# Patient Record
Sex: FEMALE
[De-identification: names, ages, dates, MRNs, and addresses within clinical notes are randomized per-mention and may not be internally consistent; named-entity substitution may affect disease eponyms.]

---

## 2019-03-30 ENCOUNTER — HOSPITAL ENCOUNTER (INPATIENT)
Dept: HOSPITAL 42 - ED | Age: 49
LOS: 6 days | Discharge: HOME | DRG: 154 | End: 2019-04-05
Attending: INTERNAL MEDICINE | Admitting: INTERNAL MEDICINE
Payer: COMMERCIAL

## 2019-03-30 VITALS — BODY MASS INDEX: 23.3 KG/M2

## 2019-03-30 DIAGNOSIS — K46.0: Primary | ICD-10-CM

## 2019-03-30 DIAGNOSIS — F17.200: ICD-10-CM

## 2019-03-30 DIAGNOSIS — E87.6: ICD-10-CM

## 2019-03-30 DIAGNOSIS — J02.9: ICD-10-CM

## 2019-03-30 DIAGNOSIS — K52.9: ICD-10-CM

## 2019-03-30 DIAGNOSIS — D50.9: ICD-10-CM

## 2019-03-30 DIAGNOSIS — Z98.84: ICD-10-CM

## 2019-03-30 LAB
ALBUMIN SERPL-MCNC: 4 G/DL
ALBUMIN/GLOB SERPL: 1.4 {RATIO}
ALT SERPL-CCNC: 19 U/L
AMYLASE SERPL-CCNC: 49 U/L
APPEARANCE UR: CLEAR
APTT BLD: 28.9 SECONDS
AST SERPL-CCNC: 25 U/L
BASOPHILS # BLD AUTO: 0.01 K/MM3
BASOPHILS NFR BLD: 0.1 %
BILIRUB UR-MCNC: NEGATIVE MG/DL
BUN SERPL-MCNC: 18 MG/DL
CALCIUM SERPL-MCNC: 9.1 MG/DL
COLOR UR: (no result)
EOSINOPHIL # BLD: 0.1 10*3/UL
EOSINOPHIL NFR BLD: 0.6 %
EPI CELLS #/AREA URNS HPF: (no result) /HPF
ERYTHROCYTE [DISTWIDTH] IN BLOOD BY AUTOMATED COUNT: 15.9 %
GFR NON-AFRICAN AMERICAN: > 60
GLUCOSE UR STRIP-MCNC: NEGATIVE MG/DL
HGB BLD-MCNC: 11.9 G/DL
INR PPP: 1.18
LEUKOCYTE ESTERASE UR-ACNC: NEGATIVE LEU/UL
LIPASE SERPL-CCNC: 46 U/L
LYMPHOCYTES # BLD: 1.8 10*3/UL
LYMPHOCYTES NFR BLD AUTO: 20.7 %
MCH RBC QN AUTO: 27.2 PG
MCHC RBC AUTO-ENTMCNC: 32.6 G/DL
MCV RBC AUTO: 83.3 FL
MONOCYTES # BLD AUTO: 0.7 10*3/UL
MONOCYTES NFR BLD: 7.8 %
PH UR STRIP: 6 [PH]
PLATELET # BLD: 251 10^3/UL
PMV BLD AUTO: 10.3 FL
PROT UR STRIP-MCNC: NEGATIVE MG/DL
PROTHROMBIN TIME: 13.1 SECONDS
RBC # BLD AUTO: 4.38 10^6/UL
RBC # UR STRIP: (no result) /UL
SP GR UR STRIP: 1.01
UROBILINOGEN UR STRIP-ACNC: 0.2 E.U./DL
WBC # BLD AUTO: 8.7 10^3/UL
WBC #/AREA URNS HPF: (no result) /HPF

## 2019-03-30 RX ADMIN — METRONIDAZOLE SCH: 500 INJECTION, SOLUTION INTRAVENOUS at 23:42

## 2019-03-30 RX ADMIN — METRONIDAZOLE SCH MLS/HR: 500 INJECTION, SOLUTION INTRAVENOUS at 20:49

## 2019-03-30 NOTE — CP.PCM.CON
<Tj Magdaleno - Last Filed: 03/30/19 20:42>





History of Present Illness





- History of Present Illness


History of Present Illness: 





Surgery Consult note. Dr. Jimenez 





49yo F w PMHx of Anemia and gastric bypass surgery Sept 2017 comes in with 2 

days of left sided abdominal pain which is worsening, nausea and vomiting x 2 

episodes today, obstipation since yesterday AM and PO intolerance. Abdominal 

pain described constant, sharp, severe and worsening. Patient reports 160lb 

weight loss since bariatric surgery 2.5 years ago. Denies ever having similar 

symptoms before. Denies any fevers or chills. No Chest pain, no SOB. Last meal 

yesterday morning ~40 hours ago. Has had sips of water since and has had severe 

abdominal pain when she drank some juice yesterday afternoon. Last BM yesterday 

morning, no flatus since. No urinary complaints. 





CT scan in the ED with concerns for severely congested small intestine in the 

LUQ with gastric bypass hx concerning for a possible internal hernia. 





PMHx: Anemia


PSHx: Gastric Bypass surgery Sept 2017. Has lost approx 160lbs since


Family Hx: Non-contributory


Social Hx: Admits to Tobacco use. Denies ETOH abuse. Denies illicit drugs


Allergy: ASA





Review of Systems





- Review of Systems


All systems: reviewed and no additional remarkable complaints except





- Constitutional


Constitutional: Anorexia, Weight Loss





- EENT


Eyes: absent: Change in Vision





- Cardiovascular


Cardiovascular: absent: Chest Pain, Diaphoresis, Dyspnea





- Respiratory


Respiratory: absent: Dyspnea





- Gastrointestinal


Gastrointestinal: Abdominal Pain, Belching, Change in Bowel Habits, Nausea, 

Vomiting





- Genitourinary


Genitourinary: absent: Dysuria





Past Patient History





- Past Medical History & Family History


Past Medical History?: Yes


Past Family History: Reviewed and not pertinent





- Past Social History


Smoking Status: Current Some Days Smoker


Alcohol: None


Drugs: Denies





- CARDIAC


Hx Pacemaker: No





- NEUROLOGICAL


Hx Paralysis: No





- HEMATOLOGICAL/ONCOLOGICAL


Hx Blood Transfusions: Yes


Hx Blood Transfusion Reaction: No





- MUSCULOSKELETAL/RHEUMATOLOGICAL


Hx Musculoskeletal Disorders: No





- PSYCHIATRIC


Hx Psychophysiologic Disorder: No


Hx Substance Use: No





- SURGICAL HISTORY


Other/Comment: BARIATRIC





- ANESTHESIA


Hx Anesthesia Reactions: No


Hx Malignant Hyperthermia: No





Meds


Allergies/Adverse Reactions: 


                                    Allergies











Allergy/AdvReac Type Severity Reaction Status Date / Time


 


aspirin Allergy  SWELLING Verified 03/30/19 13:48














- Medications


Medications: 


                               Current Medications





Sodium Chloride (Sodium Chloride 0.9%)  1,000 mls @ 75 mls/hr IV .[id number] DOLORES


Morphine Sulfate (Morphine)  2 mg IVP Q4 PRN


   PRN Reason: Pain, moderate (4-7)


Ondansetron HCl (Zofran Inj)  4 mg IVP Q4H PRN


   PRN Reason: Nausea/Vomiting











Physical Exam





- Constitutional


Appears: Non-toxic





- Head Exam


Head Exam: ATRAUMATIC, NORMAL INSPECTION, NORMOCEPHALIC





- Eye Exam


Eye Exam: EOMI, Normal appearance.  absent: Scleral icterus





- ENT Exam


ENT Exam: Mucous Membranes Moist





- Respiratory Exam


Respiratory Exam: NORMAL BREATHING PATTERN.  absent: Accessory Muscle Use, 

Respiratory Distress





- Cardiovascular Exam


Cardiovascular Exam: RRR.  absent: JVD





- GI/Abdominal Exam


Additional comments: 





LUQ severe tenderness to palpation


Some rebound tenderness, pain in LUQ when right abdomen palpation and release


Non-distended


soft





- Extremities Exam


Extremities exam: Positive for: normal inspection.  Negative for: calf 

tenderness





- Back Exam


Back exam: NORMAL INSPECTION





- Neurological Exam


Neurological exam: Alert, Oriented x3





- Skin


Skin Exam: Dry, Intact, Normal Color, Warm





Results





- Vital Signs


Recent Vital Signs: 


                                Last Vital Signs











Temp  98.3 F   03/30/19 13:48


 


Pulse  74   03/30/19 17:14


 


Resp  18   03/30/19 17:14


 


BP  117/73   03/30/19 17:14


 


Pulse Ox  99   03/30/19 17:14














- Labs


Result Diagrams: 


                                 03/30/19 14:25





                                 03/30/19 14:25


Labs: 


                         Laboratory Results - last 24 hr











  03/30/19 03/30/19 03/30/19





  14:25 14:25 17:30


 


WBC  8.7  


 


RBC  4.38  


 


Hgb  11.9 L  


 


Hct  36.5  


 


MCV  83.3  


 


MCH  27.2  


 


MCHC  32.6  


 


RDW  15.9 H  


 


Plt Count  251  


 


MPV  10.3  


 


Neut % (Auto)  70.8 H  


 


Lymph % (Auto)  20.7 L  


 


Mono % (Auto)  7.8 H  


 


Eos % (Auto)  0.6 L  


 


Baso % (Auto)  0.1  


 


Lymph # (Auto)  1.8  


 


Mono # (Auto)  0.7 H  


 


Eos # (Auto)  0.1  


 


Baso # (Auto)  0.01  


 


Absolute Neuts (auto)  6.19  


 


Sodium   138 


 


Potassium   4.1 


 


Chloride   105 


 


Carbon Dioxide   24 


 


Anion Gap   13 


 


BUN   18 


 


Creatinine   0.6 L 


 


Est GFR ( Amer)   > 60 


 


Est GFR (Non-Af Amer)   > 60 


 


Random Glucose   107 


 


Calcium   9.1 


 


Magnesium   1.9 


 


Total Bilirubin   0.4 


 


AST   25 


 


ALT   19 


 


Alkaline Phosphatase   73 


 


Total Protein   6.9 


 


Albumin   4.0 


 


Globulin   2.9 


 


Albumin/Globulin Ratio   1.4 


 


Amylase   49 


 


Lipase   46 


 


Urine Color    Light yellow


 


Urine Appearance    Clear


 


Urine pH    6.0


 


Ur Specific Gravity    1.010


 


Urine Protein    Negative


 


Urine Glucose (UA)    Negative


 


Urine Ketones    15 H


 


Urine Blood    Trace-intact H


 


Urine Nitrate    Negative


 


Urine Bilirubin    Negative


 


Urine Urobilinogen    0.2


 


Ur Leukocyte Esterase    Negative


 


Urine RBC    1 - 3 H


 


Urine WBC    None


 


Ur Epithelial Cells    0 - 2














Assessment & Plan





- Assessment and Plan (Free Text)


Assessment: 





49yo F with Hx of Anemia and Gastric Bypass surgery here with an acute abdomen; 

Exam and CT findings with concerns for an internal hernia





Plan: 





- NPO


- Aggressive IVF


- To OR ASAP


- Consent obtained and on chart


- Type and cross 2U PRBC on hold for OR


- f/u PT/PTT


- IV Abx: Rocephin/Flagyl


- Becerra 


- Strict Is & Os


- antiemetics prn


- pain control





Further recs as per Dr. Tony Magdaleno PGY2


Surgery





<Hemal Jimenez - Last Filed: 04/01/19 20:57>





Meds





- Medications


Medications: 


                               Current Medications





Benzocaine/Menthol (Cepacol Sore Throat)  1 gordo MT Q2H PRN


   PRN Reason: Sore Throat


   Last Admin: 04/01/19 18:09 Dose:  1 gordo


Enoxaparin Sodium (Lovenox)  40 mg SC DAILY [facility]; Protocol


   Last Admin: 03/31/19 09:30 Dose:  40 mg


Hydromorphone HCl (Dilaudid)  0.25 mg IVP Q4H PRN


   PRN Reason: Pain, severe (8-10)


Dextrose/Sodium Chloride (Dextrose 5%/0.45% Ns 1000 Ml)  1,000 mls @ 150 mls/hr 

IV .Q6H40M [facility]


Ketorolac Tromethamine (Toradol)  15 mg IVP Q6 DOLORES


   Stop: 04/03/19 06:01


   Last Admin: 04/01/19 19:32 Dose:  Not Given


Ondansetron HCl (Zofran Inj)  4 mg IVP Q4H PRN


   PRN Reason: Nausea/Vomiting


Pantoprazole Sodium (Protonix Inj)  40 mg IVP DAILY [facility]


   Last Admin: 03/31/19 09:32 Dose:  40 mg











Results





- Vital Signs


Recent Vital Signs: 


                                Last Vital Signs











Temp  98.2 F   04/01/19 16:46


 


Pulse  104 H  04/01/19 16:46


 


Resp  20   04/01/19 16:46


 


BP  139/68   04/01/19 16:46


 


Pulse Ox  98   04/01/19 16:46














- Labs


Result Diagrams: 


                                 04/01/19 05:30





                                 04/01/19 05:30


Labs: 


                         Laboratory Results - last 24 hr











  04/01/19 04/01/19 04/01/19





  05:30 05:30 09:01


 


WBC  6.0  


 


RBC  3.29 L  


 


Hgb  8.9 L  


 


Hct  28.2 L  


 


MCV  85.7  


 


MCH  27.1  


 


MCHC  31.6  


 


RDW  16.4 H  


 


Plt Count  186  


 


MPV  10.0  


 


Sodium   137 


 


Potassium   3.6 


 


Chloride   107 


 


Carbon Dioxide   26 


 


Anion Gap   8 L 


 


BUN   13 


 


Creatinine   0.6 L 


 


Est GFR ( Amer)   > 60 


 


Est GFR (Non-Af Amer)   > 60 


 


Random Glucose   68 L 


 


Calcium   7.9 L 


 


Iron    11 L


 


TIBC    290


 


% Saturation    4 L


 


Total Bilirubin   0.3 


 


AST   20 


 


ALT   25 


 


Alkaline Phosphatase   48 


 


Total Protein   4.9 L 


 


Albumin   2.5 L 


 


Globulin   2.4 


 


Albumin/Globulin Ratio   1.1 














Assessment & Plan





- Assessment and Plan (Free Text)


Plan: 


Dx Internal Hernia/Bowel obstruction


Gage: OR Post hydration





This consult done under my direct supervision





JONATAN Jimenez MD FACS

## 2019-03-30 NOTE — CT
Date of service: 



03/30/2019



PROCEDURE:  CT Abdomen and Pelvis with contrast



HISTORY:

diffuse abd. pain with h/o gastric bypass



COMPARISON:

CT 06/18/2016



TECHNIQUE:

Contrast dose: 100 cc of Omni 350



Radiation dose:



Total exam DLP = 313.09 mGy-cm.



This CT exam was performed using one or more of the following dose 

reduction techniques: Automated exposure control, adjustment of the 

mA and/or kV according to patient size, and/or use of iterative 

reconstruction technique.



FINDINGS:



LOWER THORAX:

Unremarkable. 



LIVER:

Unremarkable. No gross lesion or ductal dilatation. 



GALLBLADDER AND BILE DUCTS:

Unremarkable. 



PANCREAS:

Unremarkable. No gross lesion or ductal dilatation.



SPLEEN:

Unremarkable. 



ADRENALS:

Unremarkable. No mass. 



KIDNEYS AND URETERS:

Unremarkable. No hydronephrosis. No solid mass. 



VASCULATURE:

Unremarkable. No aortic aneurysm. No aortic atherosclerotic 

calcification or mural plaque present.



BOWEL:

Patient has a history of previous gastric bypass.  This patient 

population is at risk for internal hernias which can be very 

difficult to diagnose on CT.  Surgical consultation is recommended.  

Small bowel loops in the left upper quadrant show severe mural 

thickening and mural edema.  There is no evidence of pneumatosis or 

free air.  There is no obstruction.  Findings were discussed with Dr. Montelongo at 3:50 p.m. 03/30/2019



APPENDIX:

Normal appendix. 



PERITONEUM:

Unremarkable. No free fluid. No free air. 



LYMPH NODES:

Unremarkable. No enlarged lymph nodes. 



BLADDER:

Unremarkable. 



REPRODUCTIVE:

Unremarkable. 



BONES:

No acute fracture. 



OTHER FINDINGS:

None.



IMPRESSION:

 Small bowel loops in the left upper quadrant show severe mural 

thickening and mural edema.  There is no evidence of pneumatosis or 

free air.Patient has a history of previous gastric bypass.  This 

patient population is at risk for internal hernias which can be very 

difficult to diagnose on CT.  Surgical consultation is recommended.

## 2019-03-30 NOTE — CP.PCM.PCO
Physician Communication Note





- Physician Communication Note


Physician Communication Note: Acute abdomen r/o Int Hernia/OR ASAP

## 2019-03-30 NOTE — ED PDOC
Arrival/HPI





- General


Chief Complaint: Abdominal Pain


Historian: Patient





- History of Present Illness


Narrative History of Present Illness (Text): 


03/30/19 17:21


A 48 year old female, whose past medical history includes gastric bypass surgery

(09/2017), presents to the emergency department complaining of diffuse abdominal

pain with decreased PO intake. Patient reports having a few episodes of non-

bloody vomiting. Also, she states she is straining to have a bowel movement. 

Patient denies any fever, dysuria, or any other complaints at this time.








Past Medical History





- Provider Review


Nursing Documentation Reviewed: Yes





- Cardiac


Hx Pacemaker: No





- Neurological


Hx Paralysis: No





- Hematological/Oncological


Hx Blood Transfusions: Yes


Hx Blood Transfusion Reaction: No





- Musculoskeletal/Rheumatological


Hx Musculoskeletal Disorders: No





- Psychiatric


Hx Psychophysiologic Disorder: No


Hx Substance Use: No





- Surgical History


Other/Comment: BARIATRIC





- Anesthesia


Hx Anesthesia Reactions: No


Hx Malignant Hyperthermia: No





- Suicidal Assessment


Feels Threatened In Home Enviroment: No





Family/Social History





- Physician Review


Nursing Documentation Reviewed: Yes


Family/Social History: No Known Family HX


Smoking Status: Current Some Days Smoker


Hx Alcohol Use: Yes (SOCIALLY)


Hx Substance Use: No





Allergies/Home Meds


Allergies/Adverse Reactions: 


Allergies





aspirin Allergy (Verified 03/30/19 13:48)


   SWELLING








Home Medications: 


                                    Home Meds











 Medication  Instructions  Recorded  Confirmed


 


No Known Home Med  03/30/19 03/30/19














Review of Systems





- Physician Review


All systems were reviewed & negative as marked: Yes





- Review of Systems


Constitutional: absent: Fevers


Gastrointestinal: Abdominal Pain, Vomiting (non-bloody)


Genitourinary Female: Dysuria





Physical Exam


Vital Signs Reviewed: Yes





Vital Signs











  Temp Pulse Resp BP Pulse Ox


 


 03/30/19 17:14   74  18  117/73  99


 


 03/30/19 14:57   55 L  18  119/75  99


 


 03/30/19 13:48  98.3 F  65  16  130/93 H  98











Temperature: Afebrile


Blood Pressure: Normal


Pulse: Regular


Respiratory Rate: Normal


Appearance: Positive for: Well-Appearing, Non-Toxic, Comfortable


Pain Distress: Mild


Mental Status: Positive for: Alert and Oriented X 3





- Systems Exam


Head: Present: Atraumatic, Normocephalic


Pupils: Present: PERRL


Extroacular Muscles: Present: EOMI


Conjunctiva: Present: Normal


Mouth: Present: Moist Mucous Membranes


Neck: Present: Normal Range of Motion


Respiratory/Chest: Present: Clear to Auscultation, Good Air Exchange.  No: 

Respiratory Distress, Accessory Muscle Use


Cardiovascular: Present: Regular Rate and Rhythm, Normal S1, S2.  No: Murmurs


Abdomen: Present: Tenderness (diffuse tenderness (left>right)).  No: Distention,

Peritoneal Signs


Back: Present: Normal Inspection


Upper Extremity: Present: Normal Inspection.  No: Cyanosis, Edema


Lower Extremity: Present: Normal Inspection.  No: Edema


Neurological: Present: GCS=15, CN II-XII Intact, Speech Normal


Skin: Present: Warm, Dry, Normal Color.  No: Rashes


Psychiatric: Present: Alert, Oriented x 3, Normal Insight, Normal Concentration





Medical Decision Making


ED Course and Treatment: 


03/30/19 17:24


Impression: 48 year old female diffuse abdominal pain.





Plan:


-- Abd/Pelvis CT


-- Labs


-- Morphine


-- Peptid


-- Zofran


-- IV Fluids


-- Rocephin


-- Blood Culture


-- Urine Culture


-- Urinalysis


-- Reassess and disposition





Progress Notes:


03/30/2019 


Abd/Pelvis CT


IMPRESSION: Small bowel loops in the left upper quadrant show severe mural 

thickening and mural edema.  There is no evidence of pneumatosis or free 

air.Patient has a history of previous gastric bypass.  This patient population 

is at risk for internal hernias which can be very difficult to diagnose on CT.  

Surgical consultation is recommended.


Dictator: Colin Coulter MD





03/30/19 16:00


Case discussed with Dr. Boucher, covering for Dr. Knight, who accepts patient 

under Eugene's service. 





03/30/19 16:30


Case discussed with surgical resident, working under Dr. Jimenez, who will come

evaluate patient at bedside.








- Lab Interpretations


Lab Results: 





                                        











Total Bilirubin  0.4 mg/dL (0.2-1.3)   03/30/19  14:25    


 


AST  25 U/L (14-36)   03/30/19  14:25    


 


ALT  19 U/L (7-56)   03/30/19  14:25    


 


Alkaline Phosphatase  73 U/L ()   03/30/19  14:25    


 


Total Protein  6.9 g/dL (5.8-8.3)   03/30/19  14:25    


 


Albumin  4.0 g/dL (3.0-4.8)   03/30/19  14:25    


 


Globulin  2.9 gm/dL  03/30/19  14:25    


 


Albumin/Globulin Ratio  1.4  (1.1-1.8)   03/30/19  14:25    








                                        











Amylase  49 U/L ()   03/30/19  14:25    


 


Lipase  46 U/L ()   03/30/19  14:25    














- RAD Interpretation


Radiology Orders: 











03/30/19 14:07


ABD PELVIS PO & IV CONTRAST [CT] Stat 














- Medication Orders


Current Medication Orders: 











Ceftriaxone Sodium (Rocephin 1 Gram Ivpb)  1 gm in 100 mls @ 100 mls/hr IVPB 

STAT STA; Protocol


   Stop: 03/30/19 17:36


   Last Admin: 03/30/19 16:50  Dose: 100 mls/hr





eMAR Start Stop


 Document     03/30/19 16:50  BB  (Rec: 03/30/19 16:51  BB  [id number])


     Intravenous Solution


      Start Date                                 03/30/19


      Start Time                                 16:51





Sodium Chloride (Sodium Chloride 0.9%)  1,000 mls @ 999 mls/hr IV .Q1H1M ST[facility]


   Stop: 03/30/19 17:40


   Last Admin: 03/30/19 16:51  Dose: 999 mls/hr





eMAR Start Stop


 Document     03/30/19 16:51  BB  (Rec: 03/30/19 16:51  BB  [id number])


     Intravenous Solution


      Start Date                                 03/30/19


      Start Time                                 16:51








Discontinued Medications





Famotidine (Pepcid)  20 mg IVP STAT STA


   Stop: 03/30/19 14:08


   Last Admin: 03/30/19 14:32  Dose: 20 mg





IVP Administration


 Document     03/30/19 14:32  BB  (Rec: 03/30/19 14:32  BB  [id number])


     Charges for Administration


      # of IVP Administrations                   1





Sodium Chloride (Sodium Chloride 0.9%)  1,000 mls @ 1,000 mls/hr IV .Q1H STA


   Stop: 03/30/19 15:06


   Last Admin: 03/30/19 14:33  Dose: 1,000 mls/hr





eMAR Start Stop


 Document     03/30/19 14:33  BB  (Rec: 03/30/19 14:33  BB  [id number])


     Intravenous Solution


      Start Date                                 03/30/19


      Start Time                                 14:33





Morphine Sulfate (Morphine)  2 mg IVP STAT STA


   Stop: 03/30/19 14:09


   Last Admin: 03/30/19 14:31  Dose: 2 mg





MAR Pain Assessment


 Document     03/30/19 14:31  BB  (Rec: 03/30/19 14:32  BB  [facility] [place] [facility]


     Pain Reassessment


      Is this a pain reassessment?               No


     Sleep


      Is patient sleeping during reassessment?   No


     Presence of Pain


      Presence of Pain                           Yes


     Pain Scale Used


     Protocol:  [facility]


      Pain Scale Used                            Numeric


     Location


      Pain Location Body Site                    Abdomen


     Description


      Description                                Intermittent


      Intensity of Pain at present               10


      Pain Behavior                              Grasping Site


                                                 Rubbing Site


                                                 Restlessness


                                                 Facial Grimacing


                                                 Screaming


IVP Administration


 Document     03/30/19 14:31  BB  (Rec: 03/30/19 14:32  BB  [facility] [place] [facility]


     Charges for Administration


      # of IVP Administrations                   1


Re-Assess: MAR Pain Assessment


 Document     03/30/19 15:31  BB  (Rec: 03/30/19 15:59  BB  [facility] [place] [facility]


     Pain Reassessment


      Is this a pain reassessment?               Yes


     Sleep


      Is patient sleeping during reassessment?   Yes





Morphine Sulfate (Morphine)  2 mg IVP STAT STA


   Stop: 03/30/19 16:41


   Last Admin: 03/30/19 16:49  Dose: 2 mg





MAR Pain Assessment


 Document     03/30/19 16:49  BB  (Rec: 03/30/19 16:50  BB  [facility] [place] [facility]


     Pain Reassessment


      Is this a pain reassessment?               Yes


     Sleep


      Is patient sleeping during reassessment?   No


     Presence of Pain


      Presence of Pain                           Yes


     Pain Scale Used


     Protocol:  [facility]


      Pain Scale Used                            Numeric


     Location


      Left, Right or Bilateral                   Left


      Upper or Lower                             Upper


      Pain Location Body Site                    Abdomen


                                                 Back


     Description


      Description                                Intermittent


      Intensity of Pain at present               10


      Pain Behavior                              Rubbing Site


                                                 Restlessness


                                                 Facial Grimacing


IVP Administration


 Document     03/30/19 16:49  BB  (Rec: 03/30/19 16:50  BB  [facility] [place] [facility]


     Charges for Administration


      # of IVP Administrations                   1





Ondansetron HCl (Zofran Inj)  4 mg IVP STAT STA


   Stop: 03/30/19 14:08


   Last Admin: 03/30/19 14:32  Dose: 4 mg





IVP Administration


 Document     03/30/19 14:32  BB  (Rec: 03/30/19 14:32  BB  [facility] [place] [facility]


     Charges for Administration


      # of IVP Administrations                   1














- Scribe Statement


The provider has reviewed the documentation as recorded by the Janeen Gastelum





Provider Darrickibdelores Attestation:


All medical record entries made by the Darrickibdelores were at my direction and 

personally dictated by me. I have reviewed the chart and agree that the record 

accurately reflects my personal performance of the history, physical exam, 

medical decision making, and the department course for this patient. I have also

 personally directed, reviewed, and agree with the discharge instructions and 

disposition.








Disposition/Present on Arrival





- Present on Arrival


Any Indicators Present on Arrival: No


History of DVT/PE: No


History of Uncontrolled Diabetes: No


Urinary Catheter: No


History of Decub. Ulcer: No


History Surgical Site Infection Following: None





- Disposition


Have Diagnosis and Disposition been Completed?: Yes


Diagnosis: 


 Abdominal pain





Disposition: HOSPITALIZED


Disposition Time: 16:30


Condition: STABLE

## 2019-03-31 LAB
ALBUMIN SERPL-MCNC: 2.5 G/DL
ALBUMIN/GLOB SERPL: 1 {RATIO}
ALT SERPL-CCNC: 22 U/L
AST SERPL-CCNC: 20 U/L
BASOPHILS # BLD AUTO: 0.01 K/MM3
BASOPHILS NFR BLD: 0.1 %
BUN SERPL-MCNC: 14 MG/DL
CALCIUM SERPL-MCNC: 7.9 MG/DL
EOSINOPHIL # BLD: 0 10*3/UL
EOSINOPHIL NFR BLD: 0.1 %
ERYTHROCYTE [DISTWIDTH] IN BLOOD BY AUTOMATED COUNT: 15.9 %
ERYTHROCYTE [DISTWIDTH] IN BLOOD BY AUTOMATED COUNT: 16.2 %
GFR NON-AFRICAN AMERICAN: > 60
HGB BLD-MCNC: 9.4 G/DL
HGB BLD-MCNC: 9.6 G/DL
LYMPHOCYTES # BLD: 1.4 10*3/UL
LYMPHOCYTES NFR BLD AUTO: 16.2 %
MCH RBC QN AUTO: 26.8 PG
MCH RBC QN AUTO: 27.2 PG
MCHC RBC AUTO-ENTMCNC: 32 G/DL
MCHC RBC AUTO-ENTMCNC: 32.1 G/DL
MCV RBC AUTO: 83.8 FL
MCV RBC AUTO: 84.7 FL
MONOCYTES # BLD AUTO: 0.5 10*3/UL
MONOCYTES NFR BLD: 5.8 %
PLATELET # BLD: 187 10^3/UL
PLATELET # BLD: 207 10^3/UL
PMV BLD AUTO: 9.3 FL
PMV BLD AUTO: 9.9 FL
RBC # BLD AUTO: 3.46 10^6/UL
RBC # BLD AUTO: 3.58 10^6/UL
WBC # BLD AUTO: 6.8 10^3/UL
WBC # BLD AUTO: 8.4 10^3/UL

## 2019-03-31 RX ADMIN — METRONIDAZOLE SCH MLS/HR: 500 INJECTION, SOLUTION INTRAVENOUS at 06:09

## 2019-03-31 RX ADMIN — METRONIDAZOLE SCH MLS/HR: 500 INJECTION, SOLUTION INTRAVENOUS at 14:19

## 2019-03-31 RX ADMIN — PURIFIED WATER PRN LOZ: 99.05 LIQUID OPHTHALMIC at 05:20

## 2019-03-31 RX ADMIN — HYDROMORPHONE HYDROCHLORIDE PRN MG: 1 INJECTION, SOLUTION INTRAMUSCULAR; INTRAVENOUS; SUBCUTANEOUS at 16:20

## 2019-03-31 RX ADMIN — HYDROMORPHONE HYDROCHLORIDE PRN MG: 1 INJECTION, SOLUTION INTRAMUSCULAR; INTRAVENOUS; SUBCUTANEOUS at 23:32

## 2019-03-31 RX ADMIN — ENOXAPARIN SODIUM SCH MG: 40 INJECTION SUBCUTANEOUS at 09:30

## 2019-03-31 RX ADMIN — HYDROMORPHONE HYDROCHLORIDE PRN MG: 1 INJECTION, SOLUTION INTRAMUSCULAR; INTRAVENOUS; SUBCUTANEOUS at 10:55

## 2019-03-31 NOTE — CP.PCM.PN
Subjective





- Date & Time of Evaluation


Date of Evaluation: 03/31/19


Time of Evaluation: 06:55





- Subjective


Subjective: 





Surgery Progress note. Dr. Jimenez





Pt seen and examined at bedside.  Patient pulled out her NGT this morning. Bob

drain with serosang output noted, 140cc since surgery. Becerra in place. Patient 

with 100cc urine since surgery. Will give 1L bolus LR, likely thirdspacing. 

Denies any N/V. Denies any flatus or BM. States that pain is different. 





Objective





- Vital Signs/Intake and Output


Vital Signs (last 24 hours): 


                                        











Temp Pulse Resp BP Pulse Ox


 


 98.7 F   72   16   94/53 L  98 


 


 03/31/19 08:00  03/31/19 08:00  03/31/19 08:00  03/31/19 08:00  03/31/19 08:00








Intake and Output: 


                                        











 03/31/19 03/31/19





 06:59 18:59


 


Intake Total 0 


 


Output Total 240 


 


Balance -240 














- Medications


Medications: 


                               Current Medications





Benzocaine/Menthol (Cepacol Sore Throat)  1 gordo MT Q2H PRN


   PRN Reason: Sore Throat


   Last Admin: 03/31/19 05:20 Dose:  1 gordo


Enoxaparin Sodium (Lovenox)  40 mg SC DAILY [facility]; Protocol


   Last Admin: 03/31/19 09:30 Dose:  40 mg


Hydromorphone HCl (Dilaudid)  0.5 mg IVP Q4H PRN


   PRN Reason: Pain, severe (8-10)


Ceftriaxone Sodium (Rocephin 1 Gram Ivpb)  1 gm in 100 mls @ 100 mls/hr IVPB 

DAILY [facility]; Protocol


   Stop: 03/31/19 10:59


   Last Admin: 03/31/19 09:30 Dose:  100 mls/hr


Metronidazole (Flagyl)  500 mg in 100 mls @ 100 mls/hr IVPB Q8 [facility]; Protocol


   Stop: 03/31/19 14:59


   Last Admin: 03/31/19 06:09 Dose:  100 mls/hr


Lactated Ringer's (Lactated Ringer's)  1,000 mls @ 999 mls/hr IV .Q1H1M [facility]


   Last Admin: 03/31/19 09:31 Dose:  999 mls/hr


Lactated Ringer's (Lactated Ringer's)  1,000 mls @ 75 mls/hr IV .[id number] [facility]


Ketorolac Tromethamine (Toradol)  15 mg IVP Q6 [facility]


   Stop: 04/03/19 06:01


   Last Admin: 03/31/19 05:21 Dose:  15 mg


Ondansetron HCl (Zofran Inj)  4 mg IVP Q4H PRN


   PRN Reason: Nausea/Vomiting


Pantoprazole Sodium (Protonix Inj)  40 mg IVP DAILY [facility]


   Last Admin: 03/31/19 09:32 Dose:  40 mg











- Labs


Labs: 


                                        





                                 03/31/19 06:45 





                                 03/31/19 06:45 





                                        











PT  13.1 SECONDS (9.4-12.5)  H  03/30/19  08:45    


 


INR  1.18   03/30/19  08:45    


 


APTT  28.9 Seconds (26.9-38.3)   03/30/19  08:45    














- Constitutional


Appears: Well, Non-toxic, No Acute Distress





- Head Exam


Head Exam: ATRAUMATIC, NORMAL INSPECTION, NORMOCEPHALIC





- Eye Exam


Eye Exam: EOMI, Normal appearance.  absent: Scleral icterus





- ENT Exam


ENT Exam: Mucous Membranes Moist





- Respiratory Exam


Respiratory Exam: NORMAL BREATHING PATTERN.  absent: Accessory Muscle Use, 

Respiratory Distress





- Cardiovascular Exam


Cardiovascular Exam: RRR.  absent: JVD





- GI/Abdominal Exam


GI & Abdominal Exam: Soft, Normal Bowel Sounds, Rebound.  absent: Tenderness





- Neurological Exam


Neurological Exam: Alert, Awake, Oriented x3





- Psychiatric Exam


Psychiatric exam: Normal Affect, Normal Mood





- Skin


Skin Exam: Dry, Intact, Normal Color, Warm





Assessment and Plan





- Assessment and Plan (Free Text)


Assessment: 





49yo F with proximal bowel obstruction and internal hernia. S/p Exploratory 

Laparotomy; Reduction of internal hernia and entero-enterostomy POD 1





Plan: 





- Monitor drain outputs


- monitor bowel function


- IVF


- NPO 


- Strict Is and Os


- Encourage ambulation, OOBTC


- Encourage IS use


- pain management





Further recs as per Dr. Waldo Magdaleno PGY2


surgery

## 2019-03-31 NOTE — PCM.SURG1
Surgeon's Initial Post Op Note





- Surgeon's Notes


Surgeon: Dr. Jimenez


Assistant: Rasta PGY2


Type of Anesthesia: General Endo, Local


Anesthesia Administered By: Dr. Lorenzo


Pre-Operative Diagnosis: Bowel Obstruction. Internal Hernia


Operative Findings: See operative report.  Internal Hernia of Matias limb


Post-Operative Diagnosis: Same


Operation Performed: Exploratory Laparotomy; Reduction of Internal Hernia; 

Enteroenterostomy


Specimen/Specimens Removed: Anastomosis


Estimated Blood Loss: EBL {In ML}: 30


Blood Products Given: N/A


Drains Used: No Drains, Bob


Post-Op Condition: Good


Date of Surgery/Procedure: 03/31/19


Time of Surgery/Procedure: 00:39

## 2019-03-31 NOTE — HP
DATE OF EXAM:  03/31/2019



CHIEF COMPLAINT AND HISTORY OF PRESENT ILLNESS:  This is a 48-year-old

female who has come in to the hospital with complaints of abdominal pain. 

The patient states that the abdominal pain was severe.  She was having two

days of left-sided abdominal pain with nausea and vomiting.  She had two

episode.  She has a history of anemia and had gastric bypass done in

09/2017.  She says that she has lost significant amount of weight over the

past two and a half years since her surgery.  She has lost about 160

pounds.  She says the pain was about 8-9/10, nothing was making the pain

better.  She was straining to have bowel movements.  She denies any

dysuria.  No frequency.  No chest pain.  No shortness of breath.  No

dysarthria or dysphagia.  The patient was not improving and so Surgery was

called.  She had a CAT scan done that showed small bowel loops in the left

upper quadrant with mural thickening and edema.  There is no evidence of

pneumatosis or free air.  The patient was taken to the OR because of the

acute abdomen.  She had an exploratory laparotomy and reduction of an

internal hernia.  The patient this morning said she is feeling better, but

continues to have abdominal pain.  The NG tube that she had _____ she took

out.  She says that she has discomfort in her throat.  She says that her

pain continues, it is about 5/10, it is mostly in the abdomen.  She was

complaining of headache.



REVIEW OF SYSTEMS:  All other review of symptoms are within normal limits

except what is mentioned.



PAST MEDICAL HISTORY:  Anemia.



PAST SURGICAL HISTORY:  Gastric bypass in 09/2017.



SOCIAL HISTORY:  She does smoke.  She denies drugs or alcohol.



ALLERGIES:  ASPIRIN.



PHYSICAL EXAMINATION

VITAL SIGNS:  Temperature is 98.7, pulse is 72, blood pressure 94/53,

respirations 16, and O2 saturation 98%.

GENERAL:  The patient is lying in bed, comfortable, and in no acute

distress.

HEENT:  Atraumatic and normocephalic.  Anicteric sclerae.  Moist mucosa. 

Pink conjunctivae.  No oral lesions.

NECK:  No JVD, anterior and posterior adenopathy, thyromegaly, or bruits.

CARDIOVASCULAR:  S1 and S2 regular.  No murmurs, rubs or gallops.

LUNGS:  Clear to auscultation bilaterally.  No wheezes, rales, or rhonchi.

ABDOMEN:  Bowel sounds are positive.  No rebound.  She has dressing in

place.  She has a midline wound from her procedure.  The dressing is clean.

Unable to check for hepatosplenomegaly because of her pain.

EXTREMITIES:  No cyanosis, clubbing, or edema.

NEUROLOGIC:  No facial asymmetry.  Tongue is midline.  No uvula deviation. 

Power is 5/5 upper extremities and lower extremities.  Sensation intact in

upper extremities and lower extremities.

PSYCHIATRIC:  She is awake, alert and oriented x3.  No anxiety or

depression.  She has normal affect.

GENITOURINARY:  No CVA tenderness.

VASCULAR:  2+ pulses in the carotid pulses and pedal pulses.

SKIN:  No erythema or nodules

SPINE:  Shows normal curvature.



LABORATORY DATA:  White count of 8.7, hemoglobin 11.9, platelet count is

251.  INR is 1.1.  Sodium is 138, potassium is 4.1, creatinine is 0.6,

albumin is 4.0.  Urine shows blood trace, nitrites are negative, bilirubin

is negative.



CT of the abdomen and pelvis shows small bowel loops in the left upper

quadrant which is severe, mural thickening.



ASSESSMENT

1.  Small bowel obstruction, status post reduction of internal hernia.

2.  Gastric bypass history.

3.  Anemia.



PLAN:  The patient is currently comfortable.  She was advised to take

Dilaudid for her pain.  She is on Dilaudid _____.  The patient is on

lactated Ringer's, I will decrease the patient's lactated Ringer's and

place the patient on 75 mg of lactated Ringer's.  She is going to continue

with Toradol.  She is on Lovenox for DVT prophylaxis.  The patient was

given Rocephin for antibiotics.  The patient is currently n.p.o.  We will

repeat her blood work tomorrow.  She had blood cultures and urine cultures

that have been done and results are pending.







__________________________________________

Wilbert Stevenson MD





DD:  03/31/2019 9:03:52

DT:  03/31/2019 12:56:14

Job # [id number]

## 2019-04-01 LAB
% IRON SATURATION: 4 %
ALBUMIN SERPL-MCNC: 2.5 G/DL
ALBUMIN/GLOB SERPL: 1.1 {RATIO}
ALT SERPL-CCNC: 25 U/L
AST SERPL-CCNC: 20 U/L
BUN SERPL-MCNC: 13 MG/DL
CALCIUM SERPL-MCNC: 7.9 MG/DL
ERYTHROCYTE [DISTWIDTH] IN BLOOD BY AUTOMATED COUNT: 16.4 %
GFR NON-AFRICAN AMERICAN: > 60
HGB BLD-MCNC: 8.9 G/DL
IRON SERPL-MCNC: 11 UG/DL
MCH RBC QN AUTO: 27.1 PG
MCHC RBC AUTO-ENTMCNC: 31.6 G/DL
MCV RBC AUTO: 85.7 FL
PLATELET # BLD: 186 10^3/UL
PMV BLD AUTO: 10 FL
RBC # BLD AUTO: 3.29 10^6/UL
TIBC SERPL-MCNC: 290 UG/DL
WBC # BLD AUTO: 6 10^3/UL

## 2019-04-01 PROCEDURE — 0D160ZA BYPASS STOMACH TO JEJUNUM, OPEN APPROACH: ICD-10-PCS

## 2019-04-01 PROCEDURE — 0DN80ZZ RELEASE SMALL INTESTINE, OPEN APPROACH: ICD-10-PCS

## 2019-04-01 RX ADMIN — HYDROMORPHONE HYDROCHLORIDE PRN MG: 1 INJECTION, SOLUTION INTRAMUSCULAR; INTRAVENOUS; SUBCUTANEOUS at 03:46

## 2019-04-01 RX ADMIN — HYDROMORPHONE HYDROCHLORIDE PRN MG: 1 INJECTION, SOLUTION INTRAMUSCULAR; INTRAVENOUS; SUBCUTANEOUS at 08:55

## 2019-04-01 RX ADMIN — PURIFIED WATER PRN LOZ: 99.05 LIQUID OPHTHALMIC at 18:09

## 2019-04-01 RX ADMIN — HYDROMORPHONE HYDROCHLORIDE PRN MG: 1 INJECTION, SOLUTION INTRAMUSCULAR; INTRAVENOUS; SUBCUTANEOUS at 21:42

## 2019-04-01 RX ADMIN — HYDROMORPHONE HYDROCHLORIDE PRN MG: 1 INJECTION, SOLUTION INTRAMUSCULAR; INTRAVENOUS; SUBCUTANEOUS at 14:24

## 2019-04-01 RX ADMIN — HYDROMORPHONE HYDROCHLORIDE PRN MG: 1 INJECTION, SOLUTION INTRAMUSCULAR; INTRAVENOUS; SUBCUTANEOUS at 18:08

## 2019-04-01 NOTE — CP.PCM.PN
Subjective





- Date & Time of Evaluation


Date of Evaluation: 04/01/19


Time of Evaluation: 08:56





- Subjective


Subjective: 





General Surgery Progress Note for Dr. Jimenez 


Patient seen and examined at bedside this morning. Fluids continued. Patient 

endorses burping, however she denies passing gas and/or bowel movement at this 

time. Patient denies nausea/vomiting. Patient says her pain is improved. 





Objective





- Vital Signs/Intake and Output


Vital Signs (last 24 hours): 


                                        











Temp Pulse Resp BP Pulse Ox


 


 98.7 F   72   16   107/65   98 


 


 03/31/19 08:35  03/31/19 12:33  03/31/19 08:35  03/31/19 12:33  03/31/19 08:35








Intake and Output: 


                                        











 04/01/19 04/01/19





 06:59 18:59


 


Intake Total 0 


 


Output Total 455 


 


Balance -455 














- Medications


Medications: 


                               Current Medications





Benzocaine/Menthol (Cepacol Sore Throat)  1 gordo MT Q2H PRN


   PRN Reason: Sore Throat


   Last Admin: 03/31/19 05:20 Dose:  1 gordo


Enoxaparin Sodium (Lovenox)  40 mg SC DAILY [facility]; Protocol


   Last Admin: 03/31/19 09:30 Dose:  40 mg


Hydromorphone HCl (Dilaudid)  0.5 mg IVP Q4H PRN


   PRN Reason: Pain, severe (8-10)


   Last Admin: 04/01/19 03:46 Dose:  0.5 mg


Dextrose/Sodium Chloride (Dextrose 5%/0.45% Ns 1000 Ml)  1,000 mls @ 150 mls/hr 

IV .Q6H40M [facility]


Ketorolac Tromethamine (Toradol)  15 mg IVP Q6 [facility]


   Stop: 04/03/19 06:01


   Last Admin: 04/01/19 06:31 Dose:  15 mg


Ondansetron HCl (Zofran Inj)  4 mg IVP Q4H PRN


   PRN Reason: Nausea/Vomiting


Pantoprazole Sodium (Protonix Inj)  40 mg IVP DAILY [facility]


   Last Admin: 03/31/19 09:32 Dose:  40 mg











- Labs


Labs: 


                                        





                                 04/01/19 05:30 





                                 04/01/19 05:30 





                                        











PT  13.1 SECONDS (9.4-12.5)  H  03/30/19  08:45    


 


INR  1.18   03/30/19  08:45    


 


APTT  28.9 Seconds (26.9-38.3)   03/30/19  08:45    














- Additional Findings


Additional findings: 





- Constitutional


Appears: Well, Non-toxic, No Acute Distress





- Head Exam


Head Exam: ATRAUMATIC, NORMAL INSPECTION, NORMOCEPHALIC





- Eye Exam


Eye Exam: EOMI, Normal appearance.  absent: Scleral icterus





- ENT Exam


ENT Exam: Mucous Membranes Moist





- Respiratory Exam


Respiratory Exam: NORMAL BREATHING PATTERN.  absent: Accessory Muscle Use, 

Respiratory Distress





- Cardiovascular Exam


Cardiovascular Exam: absent: Tachycardia, JVD





- GI/Abdominal Exam


GI & Abdominal Exam: Soft, Normal Bowel Sounds, Rebound.  absent: Tenderness





- Neurological Exam


Neurological Exam: Alert, Awake, Oriented x3





- Psychiatric Exam


Psychiatric exam: Normal Affect, Normal Mood





- Skin


Skin Exam: Dry, Intact, Normal Color, Warm





Assessment and Plan





- Assessment and Plan (Free Text)


Assessment: 





48-year-old Female with proximal bowel obstruction and internal hernia. Patient 

is status-post Exploratory Laparotomy; Reduction of internal hernia and entero-

enterostomy; POD-2








Plan: 





- Discontinued perez this morning 


- Continue to monitor drain outputs


- Continue to monitor bowel function


- Continue with IVF at 150cc/hr


- NPO


- Strict Is and Os


- Encourage ambulation, OOBTC, and IS use


- Pain management





Further recommendations, as per Dr. Tony Estrada PGY1

## 2019-04-01 NOTE — PN
DATE:  04/01/2019



SUBJECTIVE:  The patient is 48-year-old _____ came to hospital because of

abdominal pain more from the left lower quadrant rarely associated with

nausea and vomiting.  She has history of gastric bypass surgery that upon

arrival she has CT scan of the abdomen and pelvis done that shows small

bowel loop in the left upper quadrant with mural thickening and edema so,

the patient was taken to the OR because of acute abdomen and she underwent

exploratory laparotomy and it showed internal hernia.  So, internal hernia

was reduced and enteroenterostomy was done.  On examination today, complain

of dry throat and _____ throat hurts.



PHYSICAL EXAMINATION:

VITAL SIGNS:  The patient is afebrile.  Pulse 72, respiration 18 and blood

pressure 106/65.

LUNGS:  Bilateral fair airflow.  No rhonchi or crackle.

HEART:  S1 and S2, audible.

ABDOMEN:  Soft, but palpable tenderness surgical site.  Sluggish bowel

sound.



LABORATORY DATA:  WBC 6.0, hemoglobin 8.9, hematocrit 28.2 and platelet

186.  Chemistry; sodium 137, potassium 3.6, chloride 107, CO2 of 26, BUN

13, creatinine 0.6, blood sugar 68 and iron is 11.



ASSESSMENT:

1.  Status post complete bowel obstruction with internal hernia, status

post laparotomy.

2.  Anemia, probably iron deficiency.

3.  Pharyngitis.



PLAN:  The patient will stay on IV fluid.  She is on DVT prophylaxis.  We

will continue on IV Protonix.  We will start her on Venofer.  Followup her

CBC and CMP in a.m.  She is on DVT and GI prophylaxis.  Out of bed to

chair.







__________________________________________

Jules Knight MD





DD:  04/01/2019 12:39:08

DT:  04/01/2019 13:41:14

Job # [id number]

## 2019-04-01 NOTE — OP
PROCEDURE DATE:  03/30/2019



SURGEON:  Hemal Jimenez MD.



ASSISTANT:  Tj Magdaleno DO, PGY-3



ANESTHETIST:  Sharif Lorenzo MD.



ANESTHESIA:  General endotracheal - Exparel.



PREOPERATIVE DIAGNOSES:

1.  Small bowel obstruction - acute abdomen.

2.  Rule out small-bowel viability.



POSTOPERATIVE DIAGNOSES:  Internal hernia - small bowel obstruction.



PROCEDURES:

1.  Emergency laparotomy with release of small bowel obstruction (internal

herniorrhaphy).

2.  Enteroenterostomy (Matias En [place] Doon).



OPERATIVE INDICATIONS:  The patient is a 48-year-old  American

female who has had difficulty moving her bowels for the past 6 days and 2

days of absence of flatus with retching and some vomiting.  There is some

abdominal pain of a crampy nature.



The patient had undergone a gastric stapling procedure 2-3 years earlier

when she was over 280 pounds and she now weighs 128.



She comes to the emergency room where she is examined, found to be

distended with some discomfort particularly in the left upper quadrant and

has normal laboratory data and white count and undergoes an upper GI series

without contrast due to the vomiting, demonstrating a probable internal

hernia in the left upper quadrants presumptively related to her previous

surgery.



The patient has been admitted, has been significantly rehydrated and loaded

with antibiotic and is examined on the same day as her emergency room visit

and recommended for extremely urgent surgery at this point.  Risks,

benefits and alternatives with their anticipated outcomes were discussed

with the patient and she is brought to the operating room now as an

emergent exploration rather than wait overnight for further findings.



DESCRIPTION OF PROCEDURE:  The patient is brought to the operating room,

identified by her wrist band, undergoes time-out procedure and is then

placed on the table in a supine manner.  Following the induction of general

anesthesia and the insertion of an endotracheal tube.  The Becerra catheter

is confirmed and draining, sequential compression devices are placed on her

lower extremities for venous thromboembolism prophylaxis and a nasogastric

tube is inserted and taped to the patient's nose.  The patient's abdomen is

now prepped with Hibiclens chlorhexidine preparation and the patient is

aseptically draped.



Incision is made from the xiphoid down to the umbilicus and sharp

dissection carried on through to the peritoneal cavity below.  Once the

abdomen is open, cultures are taken of clear ascites and the abdomen is now

explored.



Very careful meticulous takedown of the gastrojejunal anastomosis is

performed demonstrating no abnormality at this point.



The _____ is now dissected down to the level of the Matias En Bon[place] anastomosis

and this is found to have extremely dilated small bowel distal to same and

an internal hernia with a torsion at the level of this anastomosis.  The

entire small bowel is removed, found to be collapsed beyond this internal

hernia and the remainder of the abdomen exploration is entirely within

normal limits at this point.



Careful dissection is employed looking for further adhesions which are not

significant at this point and it is elected at this point to prevent

further torsion and internal herniation that an entero-enterostomy be

performed at the level just above the Matias-en-Bonny Doon procedure done previously

to prevent obstruction in the future if torsion recurs.



3-0 silks seromuscular sutures are employed.  The bowel is aligned in a

side-to-side manner and anastomose with a BROOKLYNN - 55 stapler and the defect

closed with a TA 60 - 3.5 stapler.  The suture line is secured with 3-0

silk interrupted suture containing hemostasis and imbricating the staple

line.  At this point the small bowel is decompressed back up to the

nasogastric tube which is advanced by the anesthesiologist through the

stomach into the jejunum and secured at this point.



The abdomen is now lavaged with saline until return is clear and the

abdomen is closed in a single mass closure of double-stranded #2 PDS

suture.  The fascia and skin are now infiltrated with the Exparel -

bupivacaine combination for long-acting postoperative analgesia and the

subcutaneous space is lavaged with saline, aspirated and closed with

running 3-0 Polysorb suture.  The skin is closed with the AutoSuture skin

stapler and a dry dressing is applied.



The patient is awakened, extubated and transported to the recovery room in

a satisfactory condition.  Sponge, instrument and suture count were

verified as correct at the end of the procedure.  Estimated blood loss

during this procedure was less than 10 ml of blood.



This dictation will be electronically signed without being read.



The surgical assistants were present throughout the procedure and were

extremely essential in the dissection and in the exploration.







__________________________________________

Hemal Jimenez MD





DD:  04/01/2019 8:52:49

DT:  04/01/2019 8:58:20

Job # [id number]

## 2019-04-02 LAB
ALBUMIN SERPL-MCNC: 2.4 G/DL
ALBUMIN/GLOB SERPL: 0.9 {RATIO}
ALT SERPL-CCNC: 19 U/L
AST SERPL-CCNC: 19 U/L
BASOPHILS # BLD AUTO: 0 K/MM3
BASOPHILS NFR BLD: 0 %
BUN SERPL-MCNC: 8 MG/DL
CALCIUM SERPL-MCNC: 7.8 MG/DL
EOSINOPHIL # BLD: 0.2 10*3/UL
EOSINOPHIL NFR BLD: 3.3 %
ERYTHROCYTE [DISTWIDTH] IN BLOOD BY AUTOMATED COUNT: 15.9 %
GFR NON-AFRICAN AMERICAN: > 60
HGB BLD-MCNC: 8.9 G/DL
LYMPHOCYTES # BLD: 1.1 10*3/UL
LYMPHOCYTES NFR BLD AUTO: 24.5 %
MCH RBC QN AUTO: 27.2 PG
MCHC RBC AUTO-ENTMCNC: 32.1 G/DL
MCV RBC AUTO: 84.7 FL
MONOCYTES # BLD AUTO: 0.3 10*3/UL
MONOCYTES NFR BLD: 6.3 %
PLATELET # BLD: 211 10^3/UL
PMV BLD AUTO: 9.8 FL
RBC # BLD AUTO: 3.27 10^6/UL
WBC # BLD AUTO: 4.6 10^3/UL

## 2019-04-02 RX ADMIN — HYDROMORPHONE HYDROCHLORIDE PRN MG: 1 INJECTION, SOLUTION INTRAMUSCULAR; INTRAVENOUS; SUBCUTANEOUS at 15:12

## 2019-04-02 RX ADMIN — PURIFIED WATER PRN LOZ: 99.05 LIQUID OPHTHALMIC at 15:12

## 2019-04-02 RX ADMIN — DEXTROSE AND SODIUM CHLORIDE SCH MLS/HR: 5; 450 INJECTION, SOLUTION INTRAVENOUS at 17:56

## 2019-04-02 RX ADMIN — ENOXAPARIN SODIUM SCH MG: 40 INJECTION SUBCUTANEOUS at 10:16

## 2019-04-02 RX ADMIN — HYDROMORPHONE HYDROCHLORIDE PRN MG: 1 INJECTION, SOLUTION INTRAMUSCULAR; INTRAVENOUS; SUBCUTANEOUS at 08:34

## 2019-04-02 RX ADMIN — HYDROMORPHONE HYDROCHLORIDE PRN MG: 1 INJECTION, SOLUTION INTRAMUSCULAR; INTRAVENOUS; SUBCUTANEOUS at 04:41

## 2019-04-02 RX ADMIN — HYDROMORPHONE HYDROCHLORIDE PRN MG: 1 INJECTION, SOLUTION INTRAMUSCULAR; INTRAVENOUS; SUBCUTANEOUS at 20:53

## 2019-04-02 NOTE — CP.PCM.PN
Subjective





- Date & Time of Evaluation


Date of Evaluation: 04/02/19


Time of Evaluation: 08:44





- Subjective


Subjective: 





General Surgery Progress Note for Dr. Jimenez 


Patient seen and examined at bedside this morning. No new complaints. No acute 

events overnight. Patient admits to passing gas this afternoon, however Patient 

denies bowel movement at this time. 








Objective





- Vital Signs/Intake and Output


Vital Signs (last 24 hours): 


                                        











Temp Pulse Resp BP Pulse Ox


 


 98.6 F   59 L  18   110/60   95 


 


 04/02/19 08:26  04/02/19 08:26  04/02/19 08:26  04/02/19 08:26  04/02/19 08:26








Intake and Output: 


                                        











 04/02/19 04/02/19





 06:59 18:59


 


Intake Total 1800 


 


Output Total 840 


 


Balance 960 














- Medications


Medications: 


                               Current Medications





Benzocaine/Menthol (Cepacol Sore Throat)  1 gordo MT Q2H PRN


   PRN Reason: Sore Throat


   Last Admin: 04/01/19 18:09 Dose:  1 gordo


Enoxaparin Sodium (Lovenox)  40 mg SC DAILY [facility]; Protocol


   Last Admin: 03/31/19 09:30 Dose:  40 mg


Hydromorphone HCl (Dilaudid)  0.25 mg IVP Q4H PRN


   PRN Reason: Pain, severe (8-10)


   Last Admin: 04/02/19 08:34 Dose:  0.25 mg


Dextrose/Sodium Chloride (Dextrose 5%/0.45% Ns 1000 Ml)  1,000 mls @ 150 mls/hr 

IV .Q6H40M [facility]


Ketorolac Tromethamine (Toradol)  15 mg IVP Q6 [facility]


   Stop: 04/03/19 06:01


   Last Admin: 04/02/19 06:35 Dose:  15 mg


Ondansetron HCl (Zofran Inj)  4 mg IVP Q4H PRN


   PRN Reason: Nausea/Vomiting


Pantoprazole Sodium (Protonix Inj)  40 mg IVP DAILY [facility]


   Last Admin: 03/31/19 09:32 Dose:  40 mg











- Labs


Labs: 


                                        





                                 04/02/19 07:00 





                                 04/02/19 07:00 





                                        











PT  13.1 SECONDS (9.4-12.5)  H  03/30/19  08:45    


 


INR  1.18   03/30/19  08:45    


 


APTT  28.9 Seconds (26.9-38.3)   03/30/19  08:45    














- Additional Findings


Additional findings: 





- Constitutional


Appears: Well, Non-toxic, No Acute Distress





- Head Exam


Head Exam: ATRAUMATIC, NORMAL INSPECTION, NORMOCEPHALIC





- Eye Exam


Eye Exam: EOMI, Normal appearance.  absent: Scleral icterus





- ENT Exam


ENT Exam: Mucous Membranes Moist





- Respiratory Exam


Respiratory Exam: NORMAL BREATHING PATTERN.  absent: Accessory Muscle Use, 

Respiratory Distress





- Cardiovascular Exam


Cardiovascular Exam: absent: Tachycardia, JVD





- GI/Abdominal Exam


GI & Abdominal Exam: Soft, Normal Bowel Sounds, Rebound.  absent: Tenderness





- Back Exam 


Back Exam: Paraspinal muscle tenderness (Right) 





- Neurological Exam


Neurological Exam: Alert, Awake, Oriented x3





- Psychiatric Exam


Psychiatric exam: Normal Affect, Normal Mood





- Skin


Skin Exam: Dry, Intact, Normal Color, Warm








Assessment and Plan





- Assessment and Plan (Free Text)


Assessment: 





48-year-old Female with proximal bowel obstruction and internal hernia. Patient 

is status-post Exploratory Laparotomy; Reduction of internal hernia and entero-

enterostomy; POD-3





Plan: 





- CLAUDIA drainage: 140cc overnight


- Urine output: 700cc overnight 


- Continue to monitor drain outputs


- NPO


- Continue to monitor bowel function


- Continue with IVF at 150cc/hr


- Strict Is and Os


- Encourage ambulation, OOBTC, and IS use


- Pain management





Further recommendations, as per Dr. Tony Estrada PGY1

## 2019-04-03 RX ADMIN — HYDROMORPHONE HYDROCHLORIDE PRN MG: 1 INJECTION, SOLUTION INTRAMUSCULAR; INTRAVENOUS; SUBCUTANEOUS at 01:48

## 2019-04-03 RX ADMIN — HYDROMORPHONE HYDROCHLORIDE PRN MG: 1 INJECTION, SOLUTION INTRAMUSCULAR; INTRAVENOUS; SUBCUTANEOUS at 15:38

## 2019-04-03 RX ADMIN — DEXTROSE AND SODIUM CHLORIDE SCH MLS/HR: 5; 450 INJECTION, SOLUTION INTRAVENOUS at 19:50

## 2019-04-03 RX ADMIN — ENOXAPARIN SODIUM SCH: 40 INJECTION SUBCUTANEOUS at 09:31

## 2019-04-03 RX ADMIN — HYDROMORPHONE HYDROCHLORIDE PRN MG: 1 INJECTION, SOLUTION INTRAMUSCULAR; INTRAVENOUS; SUBCUTANEOUS at 18:58

## 2019-04-03 RX ADMIN — HYDROMORPHONE HYDROCHLORIDE PRN MG: 1 INJECTION, SOLUTION INTRAMUSCULAR; INTRAVENOUS; SUBCUTANEOUS at 22:28

## 2019-04-03 NOTE — CP.PCM.PN
Subjective





- Date & Time of Evaluation


Date of Evaluation: 04/03/19


Time of Evaluation: 12:05





- Subjective


Subjective: 





General Surgery Progress Note for Dr. Jimenez 


Patient seen and examined at bedside this morning. No acute events overnight. 

Patient admits to passing gas this afternoon, however Patient denies bowel 

movement at this time. Patient tolerating healthy heart diet at this time. 

Patient states her pain has improved. 





Objective





- Vital Signs/Intake and Output


Vital Signs (last 24 hours): 


                                        











Temp Pulse Resp BP Pulse Ox


 


 98 F   57 L  20   127/83   95 


 


 04/03/19 07:45  04/03/19 07:45  04/03/19 07:45  04/03/19 07:45  04/03/19 07:45











- Medications


Medications: 


                               Current Medications





Benzocaine/Menthol (Cepacol Sore Throat)  1 gordo MT Q2H PRN


   PRN Reason: Sore Throat


   Last Admin: 04/02/19 15:12 Dose:  1 gordo


Enoxaparin Sodium (Lovenox)  40 mg SC DAILY [facility]; Protocol


   Last Admin: 04/03/19 09:31 Dose:  Not Given


Hydromorphone HCl (Dilaudid)  0.25 mg IVP Q4H PRN


   PRN Reason: Pain, severe (8-10)


   Last Admin: 04/03/19 01:48 Dose:  0.25 mg


Dextrose/Sodium Chloride (Dextrose 5%/0.45% Ns 1000 Ml)  1,000 mls @ 75 mls/hr 

IV .[id number] [facility]


   Last Admin: 04/02/19 17:56 Dose:  75 mls/hr


Ondansetron HCl (Zofran Inj)  4 mg IVP Q4H PRN


   PRN Reason: Nausea/Vomiting


Pantoprazole Sodium (Protonix Inj)  40 mg IVP DAILY [facility]


   Last Admin: 04/03/19 09:33 Dose:  40 mg











- Labs


Labs: 


                                        





                                 04/02/19 07:00 





                                 04/02/19 07:00 





                                        











PT  13.1 SECONDS (9.4-12.5)  H  03/30/19  08:45    


 


INR  1.18   03/30/19  08:45    


 


APTT  28.9 Seconds (26.9-38.3)   03/30/19  08:45    














- Additional Findings


Additional findings: 





- Constitutional


Appears: Well, Non-toxic, No Acute Distress





- Head Exam


Head Exam: ATRAUMATIC, NORMAL INSPECTION, NORMOCEPHALIC





- Eye Exam


Eye Exam: EOMI, Normal appearance.  absent: Scleral icterus





- ENT Exam


ENT Exam: Mucous Membranes Moist





- Respiratory Exam


Respiratory Exam: NORMAL BREATHING PATTERN.  absent: Accessory Muscle Use, 

Respiratory Distress





- Cardiovascular Exam


Cardiovascular Exam: absent: Tachycardia, JVD





- GI/Abdominal Exam


GI & Abdominal Exam: Soft, Normal Bowel Sounds, Rebound.  absent: Tenderness





- Back Exam 


Back Exam: Paraspinal muscle tenderness (Right) 





- Neurological Exam


Neurological Exam: Alert, Awake, Oriented x3





- Psychiatric Exam


Psychiatric exam: Normal Affect, Normal Mood





- Skin


Skin Exam: Dry, Intact, Normal Color, Warm





Assessment and Plan





- Assessment and Plan (Free Text)


Assessment: 





48-year-old Female with proximal bowel obstruction and internal hernia. Patient 

is status-post Exploratory Laparotomy; Reduction of internal hernia and 

entero-enterostomy; POD-4


Plan: 





- CLAUDIA drainage: ____ overnight


- Urine output: ____ overnight 


- Continue to monitor drain outputs


- Healthy Heart Diet 


- Continue to monitor bowel function


- IVF at 75cc/hr


- Strict Is and Os


- Encourage ambulation, OOBTC, and IS use


- Pain management





Further recommendations, as per Dr. Tony Estrada PGY1

## 2019-04-03 NOTE — CP.PCM.PCO
Physician Communication Note





- Physician Communication Note


Physician Communication Note: + BM/Rx 6 sm feeding diet

## 2019-04-03 NOTE — PN
DATE:  04/02/2019



SUBJECTIVE:  The patient is a 48-year-old, seen and examined, lying in bed,

complaining of feeling very hungry, wants to eat; however, she does admit

to burping, but did not pass gas, has abdominal discomfort.



PHYSICAL EXAMINATION:

VITAL SIGNS:  She is afebrile, pulse 57, respirations 20, blood pressure

115/77.

LUNGS:  Bilateral fair airflow.  No rhonchi or crackle.

HEART:  S1 and S2 audible.

ABDOMEN:  Soft.  Bowel sounds are sluggish.

NEUROLOGIC:  She is awake and alert, able to communicate.

EXTREMITIES:  Bilateral legs; no edema.



LABORATORY DATA:  WBC of 4.6, hemoglobin 8.9, hematocrit 27, platelets 211.

Chemistries; sodium 138, potassium 3.8, chloride 108, CO2 of 24, BUN 8,

creatinine 0.5, blood sugar 105 and her iron is 11.



ASSESSMENT:

1.  Status post bowel obstruction with internal hernia.

2.  History of gastric bypass.

3.  Status post internal hernia reduction with enteroenterostomy.

4.  Iron deficiency anemia.



PLAN:  We will continue the patient on IV fluids.  She is on analgesics. 

She received IV Venofer yesterday.  We will one dose tomorrow.  Continue

her on DVT prophylaxis and analgesics as needed.  Incentive spirometry has

been started.  Diet will be advanced from tomorrow morning.







__________________________________________

Jules Knight MD





DD:  04/02/2019 22:31:24

DT:  04/02/2019 23:58:55

Job # [id number]

## 2019-04-03 NOTE — PN
DATE:  04/03/2019



SUBJECTIVE:  The patient is 48 years old, seen and examined, seems to be

doing well.  She states she is eating and tolerating, had big bowel

movement, feels a lot better, still has soreness at the surgical site.



PHYSICAL EXAMINATION:

GENERAL:  She is awake and alert, able to communicate.

VITAL SIGNS:  She is afebrile, pulse 54, respirations 20, and blood

pressure 112/72.

LUNGS:  Bilateral fair airflow.  No rhonchi or crackle.

HEART:  S1 and S2 audible.

ABDOMEN:  Soft.  Palpable discomfort at the surgical site.

NEUROLOGIC:  She is awake and alert, able to communicate.



LABORATORY DATA:  There is no new labs available today.



ASSESSMENT:

1.  Status post small bowel internal hernia.

2.  Status post gastric bypass.

3.  Status post laparotomy, followed by enteroenterostomy.

4.  Electrolyte imbalance.

5.  Anemia.



PLAN:  We will continue the patient on IV fluids and analgesics as needed. 

She received two infusions of IV Venofer.  Continue DVT prophylaxis and

incentive spirometry.  We will follow up her electrolytes in the a.m. 

Possible discharge in a.m.  Advised ambulation.







__________________________________________

Jules Knight MD





DD:  04/03/2019 20:02:42

DT:  04/03/2019 21:13:11

Job # [id number]

## 2019-04-04 VITALS — RESPIRATION RATE: 20 BRPM | OXYGEN SATURATION: 96 %

## 2019-04-04 LAB
ALBUMIN SERPL-MCNC: 2.9 G/DL
ALBUMIN/GLOB SERPL: 1.2 {RATIO}
ALT SERPL-CCNC: 23 U/L
AST SERPL-CCNC: 29 U/L
BASOPHILS # BLD AUTO: 0.01 K/MM3
BASOPHILS NFR BLD: 0.2 %
BUN SERPL-MCNC: 2 MG/DL
CALCIUM SERPL-MCNC: 8.4 MG/DL
EOSINOPHIL # BLD: 0.1 10*3/UL
EOSINOPHIL NFR BLD: 2.2 %
ERYTHROCYTE [DISTWIDTH] IN BLOOD BY AUTOMATED COUNT: 16.3 %
GFR NON-AFRICAN AMERICAN: > 60
HGB BLD-MCNC: 9.9 G/DL
LYMPHOCYTES # BLD: 1.2 10*3/UL
LYMPHOCYTES NFR BLD AUTO: 25.8 %
MCH RBC QN AUTO: 27.4 PG
MCHC RBC AUTO-ENTMCNC: 32.7 G/DL
MCV RBC AUTO: 83.9 FL
MONOCYTES # BLD AUTO: 0.5 10*3/UL
MONOCYTES NFR BLD: 10.3 %
PLATELET # BLD: 252 10^3/UL
PMV BLD AUTO: 10 FL
RBC # BLD AUTO: 3.61 10^6/UL
WBC # BLD AUTO: 4.6 10^3/UL

## 2019-04-04 RX ADMIN — HYDROMORPHONE HYDROCHLORIDE PRN MG: 1 INJECTION, SOLUTION INTRAMUSCULAR; INTRAVENOUS; SUBCUTANEOUS at 05:31

## 2019-04-04 RX ADMIN — TRAMADOL HYDROCHLORIDE AND ACETAMINOPHEN PRN TAB: 37.5; 325 TABLET, COATED ORAL at 22:10

## 2019-04-04 RX ADMIN — SIMETHICONE CHEW TAB 80 MG PRN MG: 80 TABLET ORAL at 17:53

## 2019-04-04 RX ADMIN — SIMETHICONE CHEW TAB 80 MG PRN MG: 80 TABLET ORAL at 08:35

## 2019-04-04 RX ADMIN — TRAMADOL HYDROCHLORIDE AND ACETAMINOPHEN PRN TAB: 37.5; 325 TABLET, COATED ORAL at 09:58

## 2019-04-04 RX ADMIN — PANTOPRAZOLE SODIUM SCH MG: 40 TABLET, DELAYED RELEASE ORAL at 05:32

## 2019-04-04 RX ADMIN — HYDROMORPHONE HYDROCHLORIDE PRN MG: 1 INJECTION, SOLUTION INTRAMUSCULAR; INTRAVENOUS; SUBCUTANEOUS at 01:58

## 2019-04-04 RX ADMIN — ENOXAPARIN SODIUM SCH MG: 40 INJECTION SUBCUTANEOUS at 09:58

## 2019-04-04 RX ADMIN — TRAMADOL HYDROCHLORIDE AND ACETAMINOPHEN PRN TAB: 37.5; 325 TABLET, COATED ORAL at 18:32

## 2019-04-04 NOTE — RAD
Date of service: 



04/04/2019



HISTORY:

 f/u SBO 



COMPARISON:

Abdomen pelvis CT 03/30/2019



TECHNIQUE:

1 view obtained.



FINDINGS:



BOWEL:

Nonobstructive bowel gas pattern.  Surgical drain noted left upper 

quadrant epigastric region with skin staples identified at the left 

parasagittal upper abdomen as well.  No large free intra peritoneal 

gas collection identified with anastomotic suture line identified at 

the left flank lateral to 2 or 3 surgical clips.  Retained fecal 

material is seen throughout the large bowel and linear radiodensities 

at the inferior pelvis suggest Essure-type tubal ligation previously. 





BONES:

Normal.



OTHER FINDINGS:

None.



IMPRESSION:

Nonobstructive bowel gas pattern.  Postoperative changes central and 

left upper quadrant abdomen.  No large free intra peritoneal gas 

collection evident.

## 2019-04-04 NOTE — CP.PCM.PN
<Kaz Estrada - Last Filed: 04/04/19 10:28>





Subjective





- Date & Time of Evaluation


Date of Evaluation: 04/04/19


Time of Evaluation: 10:28





- Subjective


Subjective: 





General Surgery Progress Note for Dr. Jimenez 


Patient seen and examined at bedside this morning. No new complaints. Patient 

reports her pain improved. Patient admits to passing gas this afternoon, however

Patient denies bowel movement at this time. Patient tolerating healthy heart 

diet at this time.  





Objective





- Vital Signs/Intake and Output


Vital Signs (last 24 hours): 


                                        











Temp Pulse Resp BP Pulse Ox


 


 98.4 F   54 L  18   115/76   97 


 


 04/04/19 08:39  04/04/19 08:39  04/04/19 08:39  04/04/19 08:39  04/04/19 08:39








Intake and Output: 


                                        











 04/04/19 04/04/19





 06:59 18:59


 


Intake Total 1140 


 


Output Total 275 


 


Balance 865 














- Medications


Medications: 


                               Current Medications





Benzocaine/Menthol (Cepacol Sore Throat)  1 gordo MT Q2H PRN


   PRN Reason: Sore Throat


   Last Admin: 04/02/19 15:12 Dose:  1 gordo


Enoxaparin Sodium (Lovenox)  40 mg SC DAILY [facility]; Protocol


   Last Admin: 04/04/19 09:58 Dose:  40 mg


Ondansetron HCl (Zofran Inj)  4 mg IVP Q4H PRN


   PRN Reason: Nausea/Vomiting


Pantoprazole Sodium (Protonix Ec Tab)  40 mg PO 0630 [facility]


   Last Admin: 04/04/19 05:32 Dose:  40 mg


Simethicone (Mylicon Chew Tab)  80 mg PO Q4H PRN


   PRN Reason: GI distress


   Last Admin: 04/04/19 08:35 Dose:  80 mg


Tramadol/Acetaminophen (Ultracet 37.5/325 Mg)  1 tab PO Q4H PRN


   PRN Reason: Pain


   Last Admin: 04/04/19 09:58 Dose:  1 tab











- Labs


Labs: 


                                        





                                 04/04/19 07:20 





                                 04/04/19 07:20 





                                        











PT  13.1 SECONDS (9.4-12.5)  H  03/30/19  08:45    


 


INR  1.18   03/30/19  08:45    


 


APTT  28.9 Seconds (26.9-38.3)   03/30/19  08:45    














- Additional Findings


Additional findings: 





- Constitutional


Appears: Well, Non-toxic, No Acute Distress





- Head Exam


Head Exam: ATRAUMATIC, NORMAL INSPECTION, NORMOCEPHALIC





- Eye Exam


Eye Exam: EOMI, Normal appearance.  absent: Scleral icterus





- ENT Exam


ENT Exam: Mucous Membranes Moist





- Respiratory Exam


Respiratory Exam: NORMAL BREATHING PATTERN.  absent: Accessory Muscle Use, 

Respiratory Distress





- Cardiovascular Exam


Cardiovascular Exam: absent: Tachycardia, JVD





- GI/Abdominal Exam


GI & Abdominal Exam: Soft, Normal Bowel Sounds, Rebound.  absent: Tenderness





- Back Exam 


Back Exam: Paraspinal muscle tenderness (Right) 





- Neurological Exam


Neurological Exam: Alert, Awake, Oriented x3





- Psychiatric Exam


Psychiatric exam: Normal Affect, Normal Mood





- Skin


Skin Exam: Dry, Intact, Normal Color, Warm





Assessment and Plan





- Assessment and Plan (Free Text)


Assessment: 





48-year-old Female with proximal bowel obstruction and internal hernia. Patient 

is status-post Exploratory Laparotomy; Reduction of internal hernia and entero-

enterostomy; POD-5


Plan: 





- CLAUDIA drainage: 275mL overnight


- Continue to monitor drain outputs


- Healthy Heart Diet 


- Simethicone ordered 


- Continue to monitor bowel function


- IVF at 75cc/hr


- Strict Is and Os


- Encourage ambulation, OOBTC, and IS use


- Pain management





Further recommendations, as per Dr. Tony Estrada PGY1 





<Hemal Jimenez - Last Filed: 04/04/19 11:13>





Objective





- Vital Signs/Intake and Output


Vital Signs (last 24 hours): 


                                        











Temp Pulse Resp BP Pulse Ox


 


 98.4 F   54 L  18   115/76   97 


 


 04/04/19 08:39  04/04/19 08:39  04/04/19 08:39  04/04/19 08:39  04/04/19 08:39








Intake and Output: 


                                        











 04/04/19 04/04/19





 06:59 18:59


 


Intake Total 1140 


 


Output Total 275 


 


Balance 865 














- Medications


Medications: 


                               Current Medications





Benzocaine/Menthol (Cepacol Sore Throat)  1 gordo MT Q2H PRN


   PRN Reason: Sore Throat


   Last Admin: 04/02/19 15:12 Dose:  1 gordo


Enoxaparin Sodium (Lovenox)  40 mg SC DAILY [facility]; Protocol


   Last Admin: 04/04/19 09:58 Dose:  40 mg


Ondansetron HCl (Zofran Inj)  4 mg IVP Q4H PRN


   PRN Reason: Nausea/Vomiting


Pantoprazole Sodium (Protonix Ec Tab)  40 mg PO 0630 [facility]


   Last Admin: 04/04/19 05:32 Dose:  40 mg


Simethicone (Mylicon Chew Tab)  80 mg PO Q4H PRN


   PRN Reason: GI distress


   Last Admin: 04/04/19 08:35 Dose:  80 mg


Tramadol/Acetaminophen (Ultracet 37.5/325 Mg)  1 tab PO Q4H PRN


   PRN Reason: Pain


   Last Admin: 04/04/19 09:58 Dose:  1 tab











- Labs


Labs: 


                                        





                                 04/04/19 07:20 





                                 04/04/19 07:20 





                                        











PT  13.1 SECONDS (9.4-12.5)  H  03/30/19  08:45    


 


INR  1.18   03/30/19  08:45    


 


APTT  28.9 Seconds (26.9-38.3)   03/30/19  08:45    














Assessment and Plan





- Assessment and Plan (Free Text)


Plan: 


Pt has no flatus since last night/no BM/Mod-severe pains


Gage F/U KUB/Observe 24 hours more-Do Not Discharge





R Tony WALLS FACS

## 2019-04-04 NOTE — CP.PCM.PCO
Physician Communication Note





- Physician Communication Note


Physician Communication Note: Hold D/C:Rx F/U KUB-Observe 24Hrs

## 2019-04-05 VITALS — TEMPERATURE: 98.5 F | SYSTOLIC BLOOD PRESSURE: 127 MMHG | HEART RATE: 66 BPM | DIASTOLIC BLOOD PRESSURE: 79 MMHG

## 2019-04-05 LAB
ALBUMIN SERPL-MCNC: 2.6 G/DL
ALBUMIN/GLOB SERPL: 1 {RATIO}
ALT SERPL-CCNC: 30 U/L
AST SERPL-CCNC: 46 U/L
BASOPHILS # BLD AUTO: 0 K/MM3
BASOPHILS NFR BLD: 0 %
BUN SERPL-MCNC: 3 MG/DL
CALCIUM SERPL-MCNC: 8.1 MG/DL
EOSINOPHIL # BLD: 0.1 10*3/UL
EOSINOPHIL NFR BLD: 3 %
ERYTHROCYTE [DISTWIDTH] IN BLOOD BY AUTOMATED COUNT: 16.5 %
GFR NON-AFRICAN AMERICAN: > 60
HGB BLD-MCNC: 9.8 G/DL
LYMPHOCYTES # BLD: 0.9 10*3/UL
LYMPHOCYTES NFR BLD AUTO: 19.5 %
MCH RBC QN AUTO: 27.4 PG
MCHC RBC AUTO-ENTMCNC: 32.6 G/DL
MCV RBC AUTO: 84.1 FL
MONOCYTES # BLD AUTO: 0.5 10*3/UL
MONOCYTES NFR BLD: 9.7 %
PLATELET # BLD: 259 10^3/UL
PMV BLD AUTO: 10.1 FL
RBC # BLD AUTO: 3.58 10^6/UL
WBC # BLD AUTO: 4.7 10^3/UL

## 2019-04-05 RX ADMIN — ENOXAPARIN SODIUM SCH MG: 40 INJECTION SUBCUTANEOUS at 10:04

## 2019-04-05 RX ADMIN — PANTOPRAZOLE SODIUM SCH MG: 40 TABLET, DELAYED RELEASE ORAL at 05:39

## 2019-04-05 RX ADMIN — TRAMADOL HYDROCHLORIDE AND ACETAMINOPHEN PRN TAB: 37.5; 325 TABLET, COATED ORAL at 05:39

## 2019-04-05 NOTE — CT
PROCEDURE:  CT Abdomen and Pelvis without IV contrast.



HISTORY:

f/u obstruction



COMPARISON:

CT abdomen pelvis with contrast performed 3/30/19 



TECHNIQUE:

Contiguous axial images of the abdomen and pelvis. Oral contrast was 

administered. No IV contrast given. Coronal and Sagittal reformats 

generated and reviewed. 



Radiation dose:



Total exam DLP = 431.66 mGy-cm.



This CT exam was performed using one or more of the following dose 

reduction techniques: Automated exposure control, adjustment of the 

mA and/or kV according to patient size, and/or use of iterative 

reconstruction technique.



FINDINGS:

There is limited evaluation of the solid organs without the 

administration of IV contrast.



LOWER THORAX:

Bibasilar consolidations and pleural effusions.  No visible 

pneumothorax. Visualized portions of the heart appear within normal 

limits of size. 



LIVER:

Unremarkable unenhanced appearance. 



GALLBLADDER AND BILE DUCTS:

Unremarkable unenhanced appearance. 



PANCREAS:

Unremarkable unenhanced appearance. 



SPLEEN:

Unremarkable unenhanced appearance. 



ADRENALS:

Unremarkable unenhanced appearance. 



KIDNEYS AND URETERS:

No hydronephrosis or obstructing renal calculus. 



BLADDER:

Air within the urinary bladder may be secondary to instrumentation.



REPRODUCTIVE:

Uterus is present. 



APPENDIX:

The appendix appears within normal limits of caliber.



BOWEL:

Postsurgical gastric changes.  The stomach is nondistended. 



Inflammation evident at the level of the left upper quadrant in the 

region of jejunal jejunostomy site. Associated inflammation and 

mesenteric adenopathy. Findings appear consistent with enteritis. No 

evidence of small-bowel obstruction.



PERITONEUM:

Small abdominal and pelvic fluid.  No definite free air.



LYMPH NODES:

No bulky lymphadenopathy identified.



VASCULATURE:

No aortic aneurysm. 



BONES:

Degenerative changes.



OTHER FINDINGS:

Anterior surgical skin staples. 



IMPRESSION:

Inflammation evident at the level of the left upper quadrant in the 

region of jejunojejunostomy site. Associated inflammation and 

mesenteric adenopathy. Findings appear consistent with enteritis. 



Small abdominal and pelvic fluid.



Air within the urinary bladder may be secondary to recent 

instrumentation. Correlate clinically. Recommend correlation with 

urinalysis to exclude possibility of cystitis. 



Bibasilar consolidations and pleural effusions.

## 2019-04-05 NOTE — PN
DATE:  04/04/2019



SUBJECTIVE:  The patient is 48-year-old, seen and examined, complained of

epigastric discomfort, complained of feeling bloated, _____.  No nausea

noted.



PHYSICAL EXAMINATION:

VITAL SIGNS:  She is afebrile, pulse 54, respiration 18, and blood pressure

115/76.

LUNGS:  Bilateral fair airflow.  No rhonchi or crackle.

HEART:  S1 and S2 audible.

ABDOMEN:  Slightly distended with palpable _____ epigastric discomfort. 

Bowel sounds are positive.

NEUROLOGIC:  She is awake and alert; able to communicate.



LABORATORY DATA:  WBC 12.6, hemoglobin 9.9, hematocrit 30.6, and platelet

of 252.  Chemistry; sodium 140, potassium 3.3, chloride 107, CO2 of 28, BUN

2, creatinine 0.4, and blood sugar of 102.



ASSESSMENT:

1.  Status post laparotomy because of internal small bowel hernia, status

post hernia reduction and enteroenterostomy.

2.  History of gastric bypass.

3.  Hypokalemia.



PLAN:  We will supplement her potassium.  Abdominal x-ray has been ordered

by Dr. Olguin.  We will observe her for next 24 hours.  _____.  We will

follow up electrolyte in a.m.







__________________________________________

Jules Knight MD





DD:  04/04/2019 12:19:23

DT:  04/04/2019 13:47:50

Job # [id number]

## 2019-04-05 NOTE — CP.PCM.PCO
Physician Communication Note





- Physician Communication Note


Physician Communication Note: CT OK/D/C pt-f/u office/RxUltracet

## 2019-04-05 NOTE — DS
HISTORY OF PRESENT ILLNESS:  The patient is a 48-year-old who came to

emergency room on 03/30/2019, with complaint of abdominal pain, distension.

The patient has previous history of gastric bypass and CT scan was done

that shows small bowel loop in the left upper quadrant, severe mural

thickening and mural edema, no evidence of pneumatosis or free air.  The

patient has a history of previous gastric bypass, so the patient was taken

immediately to OR by Dr. Olguin.  She was found to have internal hernia

that was reduced and she has enteroenterostomy done.  Postop, she was

complaining of bloating and given simethicone.  She was kept n.p.o.  She

was given IV fluid, doing well.



PHYSICAL EXAMINATION:

GENERAL:  She is awake and alert, able to communicate.

VITAL SIGNS:  She is afebrile, pulse 66, respirations 20, blood pressure

127/79.

LUNGS:  Bilateral fair airflow.  No rhonchi or crackle.

HEART:  S1, S2 audible.

ABDOMEN:  Soft.  Slight palpable discomfort.

NEUROLOGIC:  The patient is awake and alert, able to communicate,

ambulatory.



LABORATORY DATA:  WBC 4.7, hemoglobin 9.8, hematocrit 30, platelet 259. 

Chemistry:  Sodium 138, potassium 3.7, chloride 105, CO2 of 28, BUN 3,

creatinine 0.5, blood sugar of 85, calcium 8.1.  CT scan of the abdomen and

pelvis was done today that shows inflammatory evident at the level of left

upper quadrant in the region of jejunojejunostomy associated with

inflammation and mesenteric adenopathy consistent with enteritis, small

abdominal and pelvic fluid, air within the urinary bladder may be secondary

to recent _____.



PLAN:  The patient is being discharged home.  She was advised to have soft

food.  She was given Ultracet and she will follow up with Dr. Olguin and

myself in a week or two.







__________________________________________

Jlues Knight MD





DD:  04/05/2019 20:02:31

DT:  04/05/2019 21:52:47

Job # [id number]